# Patient Record
Sex: MALE | Race: WHITE | NOT HISPANIC OR LATINO | Employment: FULL TIME | URBAN - METROPOLITAN AREA
[De-identification: names, ages, dates, MRNs, and addresses within clinical notes are randomized per-mention and may not be internally consistent; named-entity substitution may affect disease eponyms.]

---

## 2022-12-18 ENCOUNTER — APPOINTMENT (EMERGENCY)
Dept: RADIOLOGY | Facility: HOSPITAL | Age: 52
End: 2022-12-18

## 2022-12-18 ENCOUNTER — HOSPITAL ENCOUNTER (EMERGENCY)
Facility: HOSPITAL | Age: 52
Discharge: HOME | End: 2022-12-18
Attending: EMERGENCY MEDICINE

## 2022-12-18 VITALS
TEMPERATURE: 98.6 F | SYSTOLIC BLOOD PRESSURE: 161 MMHG | DIASTOLIC BLOOD PRESSURE: 84 MMHG | RESPIRATION RATE: 18 BRPM | HEART RATE: 91 BPM | OXYGEN SATURATION: 98 %

## 2022-12-18 DIAGNOSIS — S63.501A SPRAIN OF RIGHT WRIST, INITIAL ENCOUNTER: Primary | ICD-10-CM

## 2022-12-18 RX ORDER — IBUPROFEN 600 MG/1
600 TABLET ORAL ONCE
Status: COMPLETED | OUTPATIENT
Start: 2022-12-18 | End: 2022-12-18

## 2022-12-18 RX ADMIN — IBUPROFEN 600 MG: 600 TABLET, FILM COATED ORAL at 21:39

## 2022-12-19 NOTE — ED PROVIDER NOTES
History  Chief Complaint   Patient presents with   • Hand Injury     Pt arrives ambulatory to triage with a c/o right thumb pain after hitting it against firewood that was frozen together to break it apart  55yo left hand dominant male presenting for right thumb and wrist pain that began this evening  Patient was using his hand to break up frozen firewood  He had no pain initially  He woke up from a nap this evening with throbbing pain in his wrist and became worried for a fracture so came to the ED  He took Tylenol prior to arrival  No paresthesias  History provided by:  Patient   used: No    Hand Pain  Location:  R thumb/wrist  Quality:  Pain  Severity:  Moderate  Onset quality:  Gradual  Timing:  Constant  Progression:  Unchanged  Chronicity:  New  Relieved by:  Nothing  Worsened by: Movement  Associated symptoms: no fever        None       Past Medical History:   Diagnosis Date   • A-fib [facility])        History reviewed  No pertinent surgical history  History reviewed  No pertinent family history  I have reviewed and agree with the history as documented  E-Cigarette/Vaping   • E-Cigarette Use Never User      E-Cigarette/Vaping Substances   • Nicotine No    • THC No    • CBD No    • Flavoring No    • Other No    • Unknown No      Social History     Tobacco Use   • Smoking status: Never   • Smokeless tobacco: Never   Vaping Use   • Vaping Use: Never used   Substance Use Topics   • Alcohol use: Never   • Drug use: Never       Review of Systems   Constitutional: Negative for chills and fever  HENT: Negative for ear discharge and facial swelling  Eyes: Negative for discharge  Musculoskeletal: Positive for arthralgias  Negative for joint swelling  Skin: Negative for color change and wound  Neurological: Negative for weakness and numbness  Psychiatric/Behavioral: Negative for confusion  The patient is not nervous/anxious      All other systems reviewed and are negative  Physical Exam  Physical Exam  Vitals and nursing note reviewed  Constitutional:       General: He is not in acute distress  Appearance: Normal appearance  He is not toxic-appearing  HENT:      Head: Normocephalic and atraumatic  Right Ear: External ear normal       Left Ear: External ear normal    Eyes:      General: No scleral icterus  Right eye: No discharge  Left eye: No discharge  Conjunctiva/sclera: Conjunctivae normal    Cardiovascular:      Rate and Rhythm: Normal rate  Pulmonary:      Effort: Pulmonary effort is normal  No respiratory distress  Breath sounds: No stridor  Musculoskeletal:         General: No deformity  Normal range of motion  Right wrist: Tenderness and snuff box tenderness present  No swelling, deformity or lacerations  Normal range of motion  Normal pulse  Cervical back: Normal range of motion  Comments: Right hand: No swelling, deformity, or skin changes noted  There is pain at the base of the thumb and in the snuffbox  There is also pain with flexion of thumb although ROM intact  Sensation normal  2+ radial pulse  Skin:     General: Skin is warm and dry  Neurological:      General: No focal deficit present  Mental Status: He is alert  Mental status is at baseline     Psychiatric:         Mood and Affect: Mood normal          Behavior: Behavior normal          Vital Signs  ED Triage Vitals   Temperature Pulse Respirations Blood Pressure SpO2   12/18/22 2115 12/18/22 2115 12/18/22 2115 12/18/22 2115 12/18/22 2115   98 6 °F (37 °C) 91 18 161/84 98 %      Temp Source Heart Rate Source Patient Position - Orthostatic VS BP Location FiO2 (%)   12/18/22 2115 12/18/22 2115 12/18/22 2115 12/18/22 2115 --   Oral Monitor Sitting Left arm       Pain Score       12/18/22 2139       6           Vitals:    12/18/22 2115   BP: 161/84   Pulse: 91   Patient Position - Orthostatic VS: Sitting         Visual Acuity      ED Medications  Medications   ibuprofen (MOTRIN) tablet 600 mg (600 mg Oral Given 12/18/22 2139)       Diagnostic Studies  Results Reviewed     None                 XR hand 3+ views RIGHT   ED Interpretation by Guanako Castillo PA-C (12/18 2144)   No acute fracture      Final Result by Naman Wilhelm MD (12/19 5287)   No acute osseous abnormality  Workstation performed: [id number]         XR wrist 3+ views RIGHT   ED Interpretation by Guanako Castillo PA-C (12/18 2144)   No acute fracture      Final Result by Naman Wilhelm MD (12/19 9591)   No acute osseous abnormality  Workstation performed: [id number]                    Procedures  Procedures         ED Course                             MDM  Number of Diagnoses or Management Options  Sprain of right wrist, initial encounter: new and requires workup  Diagnosis management comments: 52yoM presenting for R wrist/thumb pain  He was using his hand to break up firewood earlier today  No pain initially but woke up with pain this evening  No deformity or swelling on exam  He has tenderness in the snuffbox  Hand is neurovascularly intact  X-rays of right hand and wrist obtained which are negative for fracture  Cannot r/o occult scaphoid fracture given snuffbox tenderness although mechanism is unusual  He was given a prefabricated thumb spica brace and advised to f/u with orthopedics          Amount and/or Complexity of Data Reviewed  Tests in the radiology section of CPT®: ordered and reviewed  Independent visualization of images, tracings, or specimens: yes    Risk of Complications, Morbidity, and/or Mortality  Presenting problems: low  Diagnostic procedures: low  Management options: low    Patient Progress  Patient progress: stable      Disposition  Final diagnoses:   Sprain of right wrist, initial encounter     Time reflects when diagnosis was documented in both MDM as applicable and the Disposition within this note     Time User Action Codes Description Comment    12/18/2022  9:57 PM Rena Can [[id number]] Sprain of right wrist, initial encounter       ED Disposition     ED Disposition   Discharge    Condition   Stable    Date/Time   Sun Dec 18, 2022  9:57 PM    Comment   Leo Lopez Patches discharge to home/self care  Follow-up Information     Follow up With Specialties Details Why Contact Info Additional [address] [facility] Orthopedic Surgery Schedule an appointment as soon as possible for a visit   [address] [place] [address] Lobo Hinojosa [address] [phone] [facility], [address], [place], [place], [phone] [facility] Emergency Department Emergency Medicine  If symptoms worsen [address] Emergency Department, [address] [place] [address], [phone]          There are no discharge medications for this patient  No discharge procedures on file      PDMP Review     None          ED Provider  Electronically Signed by           Sebastian Contreras PA-C  12/19/22 1208

## 2023-01-27 ENCOUNTER — OFFICE VISIT (OUTPATIENT)
Dept: OBGYN CLINIC | Facility: CLINIC | Age: 53
End: 2023-01-27

## 2023-01-27 VITALS
SYSTOLIC BLOOD PRESSURE: 120 MMHG | HEART RATE: 64 BPM | BODY MASS INDEX: 26.31 KG/M2 | HEIGHT: 74 IN | DIASTOLIC BLOOD PRESSURE: 81 MMHG | WEIGHT: 205 LBS

## 2023-01-27 DIAGNOSIS — M18.11 ARTHRITIS OF CARPOMETACARPAL (CMC) JOINT OF RIGHT THUMB: Primary | ICD-10-CM

## 2023-01-27 NOTE — PROGRESS NOTES
Assessment/Plan:  Assessment/Plan   Diagnoses and all orders for this visit:    Arthritis of carpometacarpal [place]) joint of right thumb  -     CMC brace          27-year-old left-hand-dominant male with onset of right thumb pain from injury 12/18/2022  Discussed with patient physical exam, radiographs, impression, and plan  X-rays of the right hand and wrist are unremarkable for acute osseous abnormality  Physical exam noted for mild tenderness right thumb CMC joint  He has intact range of motion and strength of the wrist and digits of the hand  There is mild pain with CMC grind  He is intact neurovascularly  Clinical impression is that he aggravated CMC joint arthritis  I discussed treatment regimen of CMC brace and supplements  Injection is not warranted at this time  I provided Aia 16 brace which he may wear during physical activity  He is to start taking turmeric at least 1000 mg daily, tart cherry at least 1000 mg daily, and glucosamine-chondroitin 2-3 times a day  He will follow-up as needed  Subjective:   Patient ID: Cassandra Ferrari is a 47 y o  male  Chief Complaint   Patient presents with   • Right Thumb - Pain       27-year-old left-hand-dominant male presents for evaluation of right thumb pain onset from injury 12/18/2022  He was banging on frozen firewood to break them up with his bare hands  He woke up from a nap and had pain described as sudden in onset, localized to the base of the thumb at the Aia 16 joint and radiating to the thenar eminence, worse with movement of the thumb and gripping, and improved with resting  He presented to the emergency room where x-ray evaluation was unremarkable  He self treated with Tylenol and rest and symptoms started to improve  He reports feeling much better  He reports still experiencing some achiness and throbbing with weather changes particularly when it is colder or damp  Hand Pain  This is a new problem   The current episode started more than 1 month ago  The problem occurs intermittently  The problem has been gradually improving  Associated symptoms include arthralgias  Pertinent negatives include no abdominal pain, chest pain, chills, fever, joint swelling, numbness, rash, sore throat or weakness  Exacerbated by: Gripping  He has tried rest and acetaminophen for the symptoms  The treatment provided significant relief  The following portions of the patient's history were reviewed and updated as appropriate: He  has a past medical history of A-fib ([address] )  He  has no past surgical history on file  His family history is not on file  He  reports that he has never smoked  He has never used smokeless tobacco  He reports that he does not drink alcohol and does not use drugs  He has No Known Allergies       Review of Systems   Constitutional: Negative for chills and fever  HENT: Negative for sore throat  Eyes: Negative for visual disturbance  Respiratory: Negative for shortness of breath  Cardiovascular: Negative for chest pain  Gastrointestinal: Negative for abdominal pain  Genitourinary: Negative for flank pain  Musculoskeletal: Positive for arthralgias  Negative for joint swelling  Skin: Negative for rash and wound  Neurological: Negative for weakness and numbness  Hematological: Does not bruise/bleed easily  Psychiatric/Behavioral: Negative for self-injury  Objective:  Vitals:    01/27/23 0811   BP: 120/81   Pulse: 64   Weight: 93 kg (205 lb)   Height: 6' 2" (1 88 m)     Right Hand Exam     Muscle Strength   The patient has normal right wrist strength  Tests   Finkelstein's test: negative    Other   Sensation: normal  Pulse: present          Observations     Right Wrist/Hand   Negative for deformity  Tenderness     Right Wrist/Hand   Tenderness in the carpometacarpal joint   No tenderness in the first dorsal compartment, second dorsal compartment, fifth dorsal compartment, sixth dorsal compartment, triangular fibrocartilage complex , scaphoid and lunate  Active Range of Motion     Right Wrist   Normal active range of motion    Strength/Myotome Testing     Right Wrist/Hand   Normal wrist strength    Tests     Right Wrist/Hand   Positive CMC grind  Negative AIN OK sign, distal radial-ulnar joint stress, Finkelstein's and TFCC load  Physical Exam  Vitals and nursing note reviewed  Constitutional:       General: He is not in acute distress  Appearance: He is well-developed  He is not ill-appearing or diaphoretic  HENT:      Head: Normocephalic and atraumatic  Right Ear: External ear normal       Left Ear: External ear normal    Eyes:      Conjunctiva/sclera: Conjunctivae normal    Neck:      Trachea: No tracheal deviation  Cardiovascular:      Rate and Rhythm: Normal rate  Pulmonary:      Effort: Pulmonary effort is normal  No respiratory distress  Abdominal:      General: There is no distension  Musculoskeletal:         General: Tenderness present  No swelling, deformity or signs of injury  Right hand: No deformity  Skin:     General: Skin is warm and dry  Coloration: Skin is not jaundiced or pale  Neurological:      Mental Status: He is alert and oriented to person, place, and time  Psychiatric:         Mood and Affect: Mood normal          Behavior: Behavior normal          Thought Content: Thought content normal          Judgment: Judgment normal          I have personally reviewed pertinent films in PACS and my interpretation is No acute osseous abnormality right wrist and hand

## 2023-05-15 ENCOUNTER — OFFICE VISIT (OUTPATIENT)
Age: 53
End: 2023-05-15

## 2023-05-15 VITALS
TEMPERATURE: 98.5 F | DIASTOLIC BLOOD PRESSURE: 102 MMHG | HEART RATE: 83 BPM | BODY MASS INDEX: 27.54 KG/M2 | RESPIRATION RATE: 18 BRPM | HEIGHT: 74 IN | SYSTOLIC BLOOD PRESSURE: 143 MMHG | WEIGHT: 214.6 LBS | OXYGEN SATURATION: 98 %

## 2023-05-15 DIAGNOSIS — R42 VERTIGO: Primary | ICD-10-CM

## 2023-05-15 RX ORDER — MECLIZINE HCL 12.5 MG/1
25 TABLET ORAL EVERY 8 HOURS PRN
Status: SHIPPED | OUTPATIENT
Start: 2023-05-15

## 2023-05-15 RX ORDER — METRONIDAZOLE 10 MG/G
GEL TOPICAL DAILY
COMMUNITY

## 2023-05-15 NOTE — PATIENT INSTRUCTIONS
Benign Paroxysmal Positional Vertigo   WHAT YOU NEED TO KNOW:   BPPV is an inner ear condition that causes you to suddenly feel dizzy  Benign means it is not serious or life-threatening  BPPV is caused by a problem with the nerves and structure of your inner ear  BPPV happens when small pieces of calcium break loose and lump together in one of your inner ear canals  DISCHARGE INSTRUCTIONS:   Return to the emergency department if:   You fall during a BPPV episode and are injured  You have a severe headache that does not go away  You have new changes in your vision or feel weak or confused  You have problems hearing, or you have ringing or buzzing in your ears  Contact your healthcare provider if:   Your BPPV symptoms do not go away or they return  You have problems with your balance, or you are falling often  You have new or increased nausea or vomiting with vertigo  You feel anxious or depressed and do not want to leave your home  You have questions or concerns about your condition or care  Medicines:   Medicines  may be recommended or prescribed to treat dizziness or nausea  Take your medicine as directed  Contact your healthcare provider if you think your medicine is not helping or if you have side effects  Tell your provider if you are allergic to any medicine  Keep a list of the medicines, vitamins, and herbs you take  Include the amounts, and when and why you take them  Bring the list or the pill bottles to follow-up visits  Carry your medicine list with you in case of an emergency  Prevent your symptoms:   Try to avoid sudden head movements  Stand up and lie down slowly  Raise and support your head when you lie down  Place pillows under your upper back and head or rest in a recliner  Change your position often when you are lying down  Try not to lie with your head on the same side for long periods of time  Roll over slowly       Wear protective gear  when you ride a bike or play sports  A helmet helps protect your head from injury  Follow up with your healthcare provider as directed: You may need to return in 1 month to check the progress of your treatment  Write down your questions so you remember to ask them during your visits  © Copyright [employer] 2022 Information is for End User's use only and may not be sold, redistributed or otherwise used for commercial purposes  The above information is an  only  It is not intended as medical advice for individual conditions or treatments  Talk to your doctor, nurse or pharmacist before following any medical regimen to see if it is safe and effective for you

## 2023-05-15 NOTE — PROGRESS NOTES
[facility]        NAME: Daja Gastelum is a 47 y o  male  : 1970    MRN: [id number]  DATE: May 15, 2023  TIME: 7:32 PM    Assessment and Plan   Vertigo [R42]  1  Vertigo  meclizine (ANTIVERT) tablet 25 mg    Ambulatory Referral to Physical Therapy            Patient Instructions       Follow up with PCP in 3-5 days  Proceed to  ER if symptoms worsen  Chief Complaint     Chief Complaint   Patient presents with   • Dizziness     Patient stated 2 days ago while walking the room was spinning  Complained when turning his head in bed he gets dizzy and fatigue  Otc was taken  History of Present Illness       Patient is a 36-year-old male who has been experiencing the room turning, dizziness, and balance for approximately 2 days  Review of Systems   Review of Systems   Constitutional: Negative for activity change, appetite change, chills and fever  HENT: Positive for congestion, postnasal drip and rhinorrhea  Negative for sore throat  Eyes: Negative for photophobia and visual disturbance  Respiratory: Negative for cough  Cardiovascular: Negative for chest pain and palpitations  Gastrointestinal: Negative for abdominal pain  Musculoskeletal: Negative for myalgias  Skin: Negative for pallor  Neurological: Positive for light-headedness  Negative for dizziness, weakness and headaches           Current Medications       Current Outpatient Medications:   •  metroNIDAZOLE (METROGEL) 1 % gel, Apply topically daily, Disp: , Rfl:     Current Facility-Administered Medications:   •  meclizine (ANTIVERT) tablet 25 mg, 25 mg, Oral, Q8H PRN, Iraida Mcbride PA-C    Current Allergies     Allergies as of 05/15/2023 - Reviewed 05/15/2023   Allergen Reaction Noted   • Pollen extract Cough 2010            The following portions of the patient's history were reviewed and updated as appropriate: allergies, current medications, past family history, past medical history, past social "history, past surgical history and problem list      Past Medical History:   Diagnosis Date   • A-fib [facility])        History reviewed  No pertinent surgical history  History reviewed  No pertinent family history  Medications have been verified  Objective   BP (!) 143/102   Pulse 83   Temp 98 5 °F (36 9 °C)   Resp 18   Ht 6' 2\" (1 88 m)   Wt 97 3 kg (214 lb 9 6 oz)   SpO2 98%   BMI 27 55 kg/m²        Physical Exam     Physical Exam  Vitals and nursing note reviewed  Constitutional:       General: He is not in acute distress  Appearance: Normal appearance  He is not ill-appearing  HENT:      Head: Normocephalic and atraumatic  Right Ear: Tympanic membrane, ear canal and external ear normal       Left Ear: Tympanic membrane, ear canal and external ear normal       Nose: Rhinorrhea present  No congestion  Mouth/Throat:      Mouth: Mucous membranes are moist       Pharynx: Oropharynx is clear  Eyes:      General: No scleral icterus  Extraocular Movements: Extraocular movements intact  Conjunctiva/sclera: Conjunctivae normal       Pupils: Pupils are equal, round, and reactive to light  Cardiovascular:      Rate and Rhythm: Normal rate and regular rhythm  Pulses: Normal pulses  Heart sounds: Normal heart sounds  Pulmonary:      Effort: Pulmonary effort is normal  No respiratory distress  Breath sounds: Normal breath sounds  Musculoskeletal:         General: Normal range of motion  Cervical back: Normal range of motion and neck supple  No tenderness  Lymphadenopathy:      Cervical: No cervical adenopathy  Skin:     General: Skin is warm  Capillary Refill: Capillary refill takes less than 2 seconds  Neurological:      General: No focal deficit present  Mental Status: He is alert and oriented to person, place, and time  Cranial Nerves: No cranial nerve deficit        Coordination: Coordination normal       Gait: Gait normal  " Psychiatric:         Mood and Affect: Mood normal          Behavior: Behavior normal          Thought Content:  Thought content normal          Judgment: Judgment normal

## 2023-05-16 DIAGNOSIS — R42 VERTIGO: Primary | ICD-10-CM

## 2023-05-16 RX ORDER — MECLIZINE HYDROCHLORIDE 25 MG/1
TABLET ORAL
Qty: 18 TABLET | Refills: 0 | Status: SHIPPED | OUTPATIENT
Start: 2023-05-16

## 2023-05-19 ENCOUNTER — EVALUATION (OUTPATIENT)
Age: 53
End: 2023-05-19

## 2023-05-19 DIAGNOSIS — H81.11 BENIGN PAROXYSMAL POSITIONAL VERTIGO OF RIGHT EAR: ICD-10-CM

## 2023-05-19 DIAGNOSIS — R42 VERTIGO: Primary | ICD-10-CM

## 2023-05-19 DIAGNOSIS — H83.2X2 VESTIBULAR HYPOFUNCTION OF LEFT EAR: ICD-10-CM

## 2023-05-19 NOTE — PROGRESS NOTES
PT Evaluation          POC expires Auth Status Total   Visits  Start date  Expiration date PT/OT + Visit Limit? Co-Insurance   23 required 30 pcy 23  No                                           Visit/Unit Tracking  AUTH Status: submitted Date               Visits  Authed: 30 pcy Used                Remaining                         Today's date: 2023  Patient name: Petra Finch Patches  : 1970  MRN: [id number]  Referring provider: Aaliyah Baires PA-C  Dx:   Encounter Diagnosis     ICD-10-CM    1  Vertigo  R42 Ambulatory Referral to Physical Therapy      2  Benign paroxysmal positional vertigo of right ear  H81 11       3  Vestibular hypofunction of left ear  H83 2X2           Assessment  Assessment details: Patient is a 47 y o  Male who presents to skilled outpatient PT with dizziness  Upon initial evaluation, pt presents with positive head thrust L side, and symptomatic (nausea/dizziness) with R Jumana Hallpike, and R sidelying  Pt was treated with R CRT 3x, negative upon reassessment  Significant amount of time spent completing patient education on diagnosis, prognosis, POC, medications, and HEP  Physical handouts provided for information and HEP  Will see pt 1-3x/week as able  POC complicated by scheduled trip memorial day lasting 3 weeks  Will cont to assess as able  He will benefit from skilled OP PT to dec dizziness and return to PLOF  Patient verbalized understanding of POC  Please contact me if you have any questions or recommendations  Thank you for the referral and the opportunity to share in [address] [place] [address]      Impairments: activity intolerance  Understanding of Dx/Px/POC: good  Prognosis: good    Goals  BPPV Goals (4 weeks):  - Patient will report complete resolution of symptoms in order to promote return to PLOF  - Patient will complete FGA in order to promote return to safe performance of ADLs  - Patient will demonstrate (-) Jumana-Hallpike test on R side  - Patient will demonstrate (-) head thrust on L side    Plan  Plan details: Will see pt as able, reports going on a trip on memorial day and will be ogne for 3 weeks  Patient would benefit from: PT [place]  Planned therapy interventions: balance, HEP, manual therapy, neuromuscular re-education, patient education  Frequency: 1-3x per week  Duration in weeks: 4  Plan of Care beginning date: 23  Plan of Care expiration date: 4 weeks - 2023  Treatment plan discussed with: Patient      Subjective Evaluation  History of Present Illness  Mechanism of injury: Pt reports when he turns his head when standing up or laying down he gets room spinning dizziness  Monday night he went to urgent care and received meclizine but has not taken it since last night       Dizziness Subjective  How long does dizziness last: 30s- 60s  How would you describe the dizziness: room spinning  Rolling in bed: Yes  Supine to/from sit: Yes  Recent hearing loss: No  Tinnitus: No  Aural fullness/ear pain: Yes  Vision changes: No  History of recent viral infections: Yes  History of migraines: No    Red Flag Screen  - Numbness: No  - Tingling: No  - Weakness: No  - Unilateral hearing loss: No  - Slurred speech: No  - Progressive hearing loss: No  - Tremors: No  - Poor coordination: No  - UMN signs: No  - LoC: No  - Rigidity: No  - Visual field loss: No  - Memory loss: No  - CN dysfunction: No  - Vertical nystagmus: No    Pain  Current pain ratin/10  Employment status: full time     Objective   Vestibular Objective  Cervical Spine AROM:  - Flexion: WFL no pain  - Extension: WFL no pain  - R Rotation: WFL no pain  - L Rotation: WFL no pain  - R Lateral Flexion: WFL no pain  - L Lateral Flexion: WFL no pain    Integrity Testing  - mVBI: ULU7  Gutierrez Yancey Rika: [facility]  - Alar Stability Test: WFL  - Posture: WFL    Oculomotor Screen  - Baseline Symptoms: 0/10  - Gaze "Holding Nystagmus: H: Normal and V: Normal Dizziness: 0/10, Observation: WFL  - Spontaneous Nystagmus Room Light: H: Normal Dizziness: 0/10, Observation: WFL  - Smooth Pursuits (central): H: Normal and V: Normal Dizziness: 0/10, Observation: WFL  - Saccades (central): H: Normal Dizziness: 0/10, Observation: WFL  - Near Pederson & Meri (normal: < 4\"/10 cm - central): H: Normal Dizziness: 0/10, Observation: WFL  - Head Thrust (moderate to severe hypofunction): H: Abnormal Dizziness: 0/10, Observation: positive L side    Positional Testing  - R Blackduck-Hallpike: Negative- symptomatic, dizzy and nausea upon return to sitting  - L Blackduck-Hallpike: Negative  - R Roll Test: Negative  - L Roll Test: Negative   - R side-lying: Negative- symptomatic, dizzy and nausea upon returning to sitting     Outcome Measures Initial Eval  5/19        mCTSIB  - FTEO (firm)  - FTEC (firm)  - FTEO (foam)  - FTEC (foam)   Defer        DGI Defer        FGA Defer        10 meter Defer        DHI Defer            Epley completed 3x  HEP R BPPV habituation   Eductaion: BPPV, after CRT, hypofunction    Precautions: sleep apnea  Past Medical History:   Diagnosis Date   • A-fib ([facility] [address] )      "

## 2023-05-22 ENCOUNTER — OFFICE VISIT (OUTPATIENT)
Age: 53
End: 2023-05-22

## 2023-05-22 DIAGNOSIS — H81.11 BENIGN PAROXYSMAL POSITIONAL VERTIGO OF RIGHT EAR: ICD-10-CM

## 2023-05-22 DIAGNOSIS — H83.2X2 VESTIBULAR HYPOFUNCTION OF LEFT EAR: ICD-10-CM

## 2023-05-22 DIAGNOSIS — R42 VERTIGO: Primary | ICD-10-CM

## 2023-05-22 NOTE — PROGRESS NOTES
Daily Note     Today's date: 2023  Patient name: Gabi Parikh Patches  : 1970  MRN: [id number]  Referring provider: Bartolo Mcadams PA-C  Dx:   Encounter Diagnosis     ICD-10-CM    1  Vertigo  R42       2  Benign paroxysmal positional vertigo of right ear  H81 11       3  Vestibular hypofunction of left ear  H83 2X2         Start Time: 0845  Stop Time: 0910  Total time in clinic (min): 25 minutes    Subjective: Pt reports last dose of meclizine was  morning  Has not had any dizziness since last session  Objective: See treatment diary below    NMR  R Cabool Hallpike: Negative 3x  L Jumana Hallpike: negative  Roll: Negative B/L  Head thrust: Negative     Patient education: Prognosis, POC    Assessment: Pt tolerated treatment well  All positional testing negative, and negative head thrust bilaterally  Significant amount of time spent completing patient education on prognosis, results of testing, and POC  Pt to return for one more f/u apt to ensure clearance without taking meclizine  Patient would benefit from continued PT to dec dizziness and return to PLOF  Plan: Continue per plan of care  POC expires Auth Status Total   Visits  Start date  Expiration date PT/OT + Visit Limit?  Co-Insurance   23 required 30 pcy 23  No                                           Visit/Unit Tracking  AUTH Status: submitted Date              Visits  Authed: 30 pcy Used                Remaining

## 2023-05-25 ENCOUNTER — OFFICE VISIT (OUTPATIENT)
Age: 53
End: 2023-05-25

## 2023-05-25 DIAGNOSIS — H81.11 BENIGN PAROXYSMAL POSITIONAL VERTIGO OF RIGHT EAR: ICD-10-CM

## 2023-05-25 DIAGNOSIS — H83.2X2 VESTIBULAR HYPOFUNCTION OF LEFT EAR: ICD-10-CM

## 2023-05-25 DIAGNOSIS — R42 VERTIGO: Primary | ICD-10-CM

## 2023-05-25 NOTE — PROGRESS NOTES
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 hanging blinds  Objective: See treatment diary below    NMR  R Jumana Hallpike: Negative 3x  L Jumana Hallpike: negative  Roll: Negative B/L  Head thrust: Negative  FGA:   MCTSIB: 30, 30, 30, 30  Patient education: Prognosis, recurrence rate    Assessment: Pt tolerated treatment well  All positional testing negative, and negative head thrust bilaterally  Per testing, he is not at an inc risk for falls and is scoring within functional limits  Significant amount of time spent completing patient education on prognosis, results of testing, recurrence and discharge  At this time, pt will be discharged as no further acute dizziness  Pt agreeable  Should pt wish to return to PT in the future, he would require a new script and POC  Plan: Discharged  POC expires Auth Status Total   Visits  Start date  Expiration date PT/OT + Visit Limit?  Co-Insurance   23 required 30 pcy 23  No                                           Visit/Unit Tracking  AUTH Status: submitted Date             Visits  Authed: 30 pcy Used                Remaining

## 2023-09-19 ENCOUNTER — OFFICE VISIT (OUTPATIENT)
Age: 53
End: 2023-09-19
Payer: COMMERCIAL

## 2023-09-19 VITALS
HEART RATE: 76 BPM | BODY MASS INDEX: 27.54 KG/M2 | DIASTOLIC BLOOD PRESSURE: 80 MMHG | HEIGHT: 74 IN | TEMPERATURE: 96.9 F | SYSTOLIC BLOOD PRESSURE: 122 MMHG | OXYGEN SATURATION: 99 % | RESPIRATION RATE: 18 BRPM | WEIGHT: 214.6 LBS

## 2023-09-19 DIAGNOSIS — L25.9 CONTACT DERMATITIS, UNSPECIFIED CONTACT DERMATITIS TYPE, UNSPECIFIED TRIGGER: Primary | ICD-10-CM

## 2023-09-19 PROCEDURE — 99213 OFFICE O/P EST LOW 20 MIN: CPT | Performed by: EMERGENCY MEDICINE

## 2023-09-19 RX ORDER — PREDNISONE 10 MG/1
TABLET ORAL
Qty: 40 TABLET | Refills: 0 | Status: SHIPPED | OUTPATIENT
Start: 2023-09-19

## 2023-09-19 NOTE — PATIENT INSTRUCTIONS
1.  Continue to apply Aveeno to areas of rash  2. Wash hands frequently  3. Take the prednisone until gone  4. Follow-up with PCP in 2 to 3 days  5.   If symptoms get worse to to the ER

## 2023-09-19 NOTE — PROGRESS NOTES
[facility]        NAME: Cassy Baum is a 47 y.o. male  : 1970    MRN: [id number]  DATE: 2023  TIME: 9:17 AM    Assessment and Plan   Contact dermatitis, unspecified contact dermatitis type, unspecified trigger [L25.9]  1. Contact dermatitis, unspecified contact dermatitis type, unspecified trigger  predniSONE 10 mg tablet            Patient Instructions   Patient Instructions   1. Continue to apply Aveeno to areas of rash  2. Wash hands frequently  3. Take the prednisone until gone  4. Follow-up with PCP in 2 to 3 days  5. If symptoms get worse to to the ER        Follow up with PCP in 3-5 days. Proceed to  ER if symptoms worsen. Chief Complaint     Chief Complaint   Patient presents with   • Possible shingles     Patient stated he got the shingles vaccine about a weeks ago. Stated he now have a rash on his right arm. History of Present Illness       81-year-old white male with a chief complaint of getting a shingles vaccine a week ago and now with a rash on his right arm. 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 The rash is not painful or burning. Review of Systems   Review of Systems   Constitutional: Negative for chills and fever. HENT: Negative for congestion and rhinorrhea. Eyes: Negative for discharge and visual disturbance. Respiratory: Negative for shortness of breath and wheezing. Cardiovascular: Negative for chest pain and palpitations. Gastrointestinal: Negative for abdominal pain and vomiting. Endocrine: Negative for polydipsia and polyuria. Genitourinary: Negative for dysuria and hematuria. Musculoskeletal: Negative for arthralgias, gait problem and neck stiffness. Skin: Positive for color change and rash. Negative for wound. Neurological: Negative for dizziness and headaches. Psychiatric/Behavioral: Negative for confusion and suicidal ideas.          Current Medications       Current Outpatient Medications:   •  metroNIDAZOLE (METROGEL) 1 % gel, Apply topically daily, Disp: , Rfl:   •  predniSONE 10 mg tablet, Take 4 pills x4 days, then 3 pills x4 days, then 2 pills x4 days, and then 1 pill x4 days, Disp: 40 tablet, Rfl: 0  •  meclizine (ANTIVERT) 25 mg tablet, Take 2 pills 3 times a day x2 days, then Take 1 pills  2 times a day x2 days, then take 1 pill once a day x2 days (Patient not taking: Reported on 9/19/2023), Disp: 18 tablet, Rfl: 0    Current Facility-Administered Medications:   •  meclizine (ANTIVERT) tablet 25 mg, 25 mg, Oral, Q8H PRN, Saskia Nicholas PA-C    Current Allergies     Allergies as of 09/19/2023 - Reviewed 09/19/2023   Allergen Reaction Noted   • Pollen extract Cough 01/01/2010            The following portions of the patient's history were reviewed and updated as appropriate: allergies, current medications, past family history, past medical history, past social history, past surgical history and problem list.     Past Medical History:   Diagnosis Date   • A-fib ([address])        No past surgical history on file. No family history on file. Medications have been verified. Objective   /80   Pulse 76   Temp (!) 96.9 °F (36.1 °C)   Resp 18   Ht 6' 2" (1.88 m)   Wt 97.3 kg (214 lb 9.6 oz)   SpO2 99%   BMI 27.55 kg/m²        Physical Exam     Physical Exam  Vitals and nursing note reviewed. Constitutional:       Appearance: Normal appearance. Comments: 60-year-old white male with a rash on his right forearm   HENT:      Head: Normocephalic and atraumatic. Comments: Patient has rosacea on his temple on the right side of his cheek. I do not appreciate any herpetic lesions. Eyes:      Extraocular Movements: Extraocular movements intact. Cardiovascular:      Rate and Rhythm: Normal rate. Pulmonary:      Effort: Pulmonary effort is normal.   Musculoskeletal:      Cervical back: Normal range of motion. Skin:     General: Skin is warm and dry. Findings: Erythema and rash present. Comments: Patient has a maculopapular rash in the antecubital region of his right forearm. There is also some linear rash to the volar aspect of his distal forearm. I do not appreciate any blisters. I believe rash is more consistent with a contact dermatitis versus shingles. Neurological:      Mental Status: He is alert.    Psychiatric:         Mood and Affect: Mood normal.

## 2024-02-09 ENCOUNTER — HOSPITAL ENCOUNTER (OUTPATIENT)
Dept: ULTRASOUND IMAGING | Facility: CLINIC | Age: 54
Discharge: HOME | End: 2024-02-09
Payer: COMMERCIAL

## 2024-02-09 DIAGNOSIS — K40.90 UNILATERAL INGUINAL HERNIA, WITHOUT OBSTRUCTION OR GANGRENE, NOT SPECIFIED AS RECURRENT: ICD-10-CM

## 2024-02-09 PROCEDURE — 76705 ECHO EXAM OF ABDOMEN: CPT

## 2024-03-12 ENCOUNTER — TELEMEDICINE (OUTPATIENT)
Dept: DERMATOLOGY | Facility: CLINIC | Age: 54
End: 2024-03-12
Payer: COMMERCIAL

## 2024-03-12 VITALS — HEIGHT: 74 IN | BODY MASS INDEX: 26.69 KG/M2 | WEIGHT: 208 LBS

## 2024-03-12 DIAGNOSIS — L71.9 ROSACEA: Primary | ICD-10-CM

## 2024-03-12 PROCEDURE — 99204 OFFICE O/P NEW MOD 45 MIN: CPT | Performed by: STUDENT IN AN ORGANIZED HEALTH CARE EDUCATION/TRAINING PROGRAM

## 2024-03-12 RX ORDER — METRONIDAZOLE 10 MG/G
GEL TOPICAL
Qty: 60 G | Refills: 3 | Status: SHIPPED | OUTPATIENT
Start: 2024-03-12

## 2024-03-12 RX ORDER — BIOTIN 10 MG
1 TABLET ORAL
COMMUNITY

## 2024-03-12 RX ORDER — AZELAIC ACID 0.15 G/G
GEL TOPICAL
Qty: 30 G | Refills: 3 | Status: SHIPPED | OUTPATIENT
Start: 2024-03-12

## 2024-03-12 RX ORDER — AZELAIC ACID 0.15 G/G
GEL TOPICAL 2 TIMES DAILY
COMMUNITY
End: 2024-03-12 | Stop reason: SDUPTHER

## 2024-03-12 RX ORDER — DOXYCYCLINE HYCLATE 20 MG
TABLET ORAL
Qty: 60 TABLET | Refills: 1 | Status: SHIPPED | OUTPATIENT
Start: 2024-03-12 | End: 2024-05-11

## 2024-03-12 RX ORDER — CYANOCOBALAMIN (VITAMIN B-12) 500 MCG
2 TABLET ORAL
COMMUNITY

## 2024-03-12 NOTE — PROGRESS NOTES
Virtual Regular Visit    Verification of patient location:    Patient is located at Home in the following state in which I hold an active license [place]      Assessment/Plan:    Problem List Items Addressed This Visit    None           Reason for visit is   Chief Complaint   Patient presents with    Virtual Regular Visit          Encounter provider Garrett Bull MD    Provider located at DERMATOLOGY [address] [place] [address] [phone]      Recent Visits  No visits were found meeting these conditions.  Showing recent visits within past 7 days and meeting all other requirements  Today's Visits  Date Type Provider Dept   03/12/24 Telemedicine Garrett Bull MD [facility]   Showing today's visits and meeting all other requirements  Future Appointments  No visits were found meeting these conditions.  Showing future appointments within next 150 days and meeting all other requirements       The patient was identified by name and date of birth. Gonzalez Sunshine was informed that this is a telemedicine visit and that the visit is being conducted through the Epic Embedded platform. He agrees to proceed..  My office door was closed. No one else was in the room.  He acknowledged consent and understanding of privacy and security of the video platform. The patient has agreed to participate and understands they can discontinue the visit at any time.    Patient is aware this is a billable service.     Subjective  Gonzalez Sunshine is a 53 y.o. male Rosacea, new patient .      HPI     Past Medical History:   Diagnosis Date    A-fib (HCC)        No past surgical history on file.    Current Outpatient Medications   Medication Sig Dispense Refill    metroNIDAZOLE (METROGEL) 1 % gel Apply topically daily      meclizine (ANTIVERT) 25 mg tablet Take 2 pills 3 times a day x2 days, then Take 1 pills  2 times a day x2 days, then take 1  "pill once a day x2 days (Patient not taking: Reported on 9/19/2023) 18 tablet 0    Multiple Vitamins-Minerals (Multivitamin Adult) CHEW Chew 1 tablet      Omega-3 Fatty Acids (Fish Oil) 300 MG CAPS Take 2 capsules by mouth      predniSONE 10 mg tablet Take 4 pills x4 days, then 3 pills x4 days, then 2 pills x4 days, and then 1 pill x4 days 40 tablet 0     Current Facility-Administered Medications   Medication Dose Route Frequency Provider Last Rate Last Admin    meclizine (ANTIVERT) tablet 25 mg  25 mg Oral Q8H PRN London Geronimo PA-C            Allergies   Allergen Reactions    Bee Pollen Cough    Pollen Extract Cough       Review of Systems    Video Exam    Vitals:    03/12/24 1039   Weight: 94.3 kg (208 lb)   Height: 6' 2\" (1.88 m)       Physical Exam     Visit Time  Total Visit Duration: 15 mins    [facility] Note     Patient Name: Gonzalez Sunshine  Encounter Date: 3/12/24     Have you been cared for by a [facility] Dermatologist in the last 3 years and, if so, which description applies to you?    NO.   I am considered a \"new\" patient and must complete all patient intake questions. I am MALE/not capable of bearing children.    REVIEW OF SYSTEMS:  Have you recently had or currently have any of the following? Recent fever or chills? No  Any non-healing wound? No   PAST MEDICAL HISTORY:  Have you personally ever had or currently have any of the following?  If \"YES,\" then please provide more detail. Skin cancer (such as Melanoma, Basal Cell Carcinoma, Squamous Cell Carcinoma?  No  Tuberculosis, HIV/AIDS, Hepatitis B or C: No  Radiation Treatment No   HISTORY OF IMMUNOSUPPRESSION:   Do you have a history of any of the following:  Systemic Immunosuppression such as Diabetes, Biologic or Immunotherapy, Chemotherapy, Organ Transplantation, Bone Marrow Transplantation?  No     Answering \"YES\" requires the addition of the dotphrase \"IMMUNOSUPPRESSED\" as the first diagnosis of the patient's visit. " "  FAMILY HISTORY:  Any \"first degree relatives\" (parent, brother, sister, or child) with the following?    Skin Cancer, Pancreatic or Other Cancer? YES, mother-melanoma   PATIENT EXPERIENCE:    Do you want the Dermatologist to perform a COMPLETE skin exam today including a clinical examination under the \"bra and underwear\" areas?  NO  If necessary, do we have your permission to call and leave a detailed message on your Preferred Phone number that includes your specific medical information?  Yes      Allergies   Allergen Reactions    Bee Pollen Cough    Pollen Extract Cough      Current Outpatient Medications:     metroNIDAZOLE (METROGEL) 1 % gel, Apply topically daily, Disp: , Rfl:     meclizine (ANTIVERT) 25 mg tablet, Take 2 pills 3 times a day x2 days, then Take 1 pills  2 times a day x2 days, then take 1 pill once a day x2 days (Patient not taking: Reported on 9/19/2023), Disp: 18 tablet, Rfl: 0    Multiple Vitamins-Minerals (Multivitamin Adult) CHEW, Chew 1 tablet, Disp: , Rfl:     Omega-3 Fatty Acids (Fish Oil) 300 MG CAPS, Take 2 capsules by mouth, Disp: , Rfl:     predniSONE 10 mg tablet, Take 4 pills x4 days, then 3 pills x4 days, then 2 pills x4 days, and then 1 pill x4 days, Disp: 40 tablet, Rfl: 0    Current Facility-Administered Medications:     meclizine (ANTIVERT) tablet 25 mg, 25 mg, Oral, Q8H PRN, London Geronimo PA-C          Whom besides the patient is providing clinical information about today's encounter?   NO ADDITIONAL HISTORIAN (patient alone provided history)    Physical Exam and Assessment/Plan by Diagnosis:    ROSACEA    Physical Exam:  Anatomic Location Affected:  Cheeks, and forehead  Morphological Description:  Background centrofacial erythema  Pertinent Positives:  Pertinent Negatives:    Additional History of Present Condition:    Duration: 10 years  Triggers: stress, spicy foods  Treatments attempted: azaelic acid, metrogel, and sometimes uses orachea tablet form (time released " "doxycycline)  Ocular symptoms? (Frequent styes, foreign body sensation in eyes, redness/irritation):  No    Assessment and Plan:  History and physical most consistent with rosacea  Educated that rosacea is a chronic condition affecting the mid-face (nose, cheeks, chin, forehead, eyelids)  Educated that rosacea often presents as redness, telangiectasias, papules and pustules  Discussed that the underlying cause of rosacea is complex, including genetic, environmental, vascular and inflammatory factors  Discussed that there are various triggers to rosacea and that keeping a symptom journal can be helpful in identifying those triggers that make rosacea worse as some are patient-specific  Educated that UV exposure is a universal trigger and recommended use of daily SPF. Specifically recommended a physical blocker SPF to reduce the risk of flaring secondary to irritation from chemical blocker.  Educated on the use of green tinted makeup to mask red coloration  Discussed treatment ladder including trigger avoidance, SPF use, topical treatment (metronidazole, ivermectin, azelaic acid, brimonidine gel, pimecrolimus), oral treatment options (short courses of oral antibiotics with sub-microbial dosing possible), laser treatment  Based on a thorough discussion of this condition and the management approach to it (including a comprehensive discussion of the known risks, side effects and potential benefits of treatment), the patient (family) agrees to implement the following specific plan:  Start use of SPF daily (look for sunscreens that list ONLY zinc oxide and titanium dioxide under \"active ingredients\")  Transition to gentle skin care products (ideally fragrance free). Cerave, cetaphil, vanicream are good brands for this.  Start journal to track any triggers and attempt to avoid all triggers  Patient would like to continue with same regimen. Therefore, he will continue with topical azelaic acid and topical metronidazole daily. "   Will prescribe doxycycline 20 mg BID to be taken as needed for flares  Follow up in 1 year or as needed.    Scribe Attestation      I,:  Chrystal James am acting as a scribe while in the presence of the attending physician.:       I,:  Garrett Bull MD personally performed the services described in this documentation    as scribed in my presence.:

## 2024-04-06 DIAGNOSIS — L71.9 ROSACEA: ICD-10-CM

## 2024-04-09 RX ORDER — DOXYCYCLINE HYCLATE 20 MG
TABLET ORAL
Qty: 180 TABLET | Refills: 1 | Status: SHIPPED | OUTPATIENT
Start: 2024-04-09 | End: 2024-07-09

## 2024-04-18 ENCOUNTER — OFFICE VISIT (OUTPATIENT)
Age: 54
End: 2024-04-18
Payer: COMMERCIAL

## 2024-04-18 VITALS — WEIGHT: 198 LBS | HEIGHT: 74 IN | BODY MASS INDEX: 25.41 KG/M2 | TEMPERATURE: 98 F

## 2024-04-18 DIAGNOSIS — L71.9 ROSACEA: ICD-10-CM

## 2024-04-18 DIAGNOSIS — Z13.89 SCREENING FOR SKIN CONDITION: Primary | ICD-10-CM

## 2024-04-18 DIAGNOSIS — L81.4 LENTIGINES: ICD-10-CM

## 2024-04-18 DIAGNOSIS — D18.01 CHERRY ANGIOMA: ICD-10-CM

## 2024-04-18 DIAGNOSIS — D22.9 MULTIPLE NEVI: ICD-10-CM

## 2024-04-18 DIAGNOSIS — L82.1 SEBORRHEIC KERATOSIS: ICD-10-CM

## 2024-04-18 PROCEDURE — 99213 OFFICE O/P EST LOW 20 MIN: CPT

## 2024-04-18 RX ORDER — EMTRICITABINE AND TENOFOVIR DISOPROXIL FUMARATE 200; 300 MG/1; MG/1
TABLET, FILM COATED ORAL
COMMUNITY
Start: 2024-04-03

## 2024-04-18 NOTE — PROGRESS NOTES
"[facility] Note     Patient Name: Gonzalez Sunshine  Encounter Date: April 18, 2024     Have you been cared for by a [facility] Dermatologist in the last 3 years and, if so, which description applies to you?    Yes.  I have been here within the last 3 years, and my medical history has NOT changed since that time.  I am MALE/not capable of bearing children.    REVIEW OF SYSTEMS:  Have you recently had or currently have any of the following? No changes in my recent health.   PAST MEDICAL HISTORY:  Have you personally ever had or currently have any of the following?  If \"YES,\" then please provide more detail. No changes in my medical history.   HISTORY OF IMMUNOSUPPRESSION: Do you have a history of any of the following:  Systemic Immunosuppression such as Diabetes, Biologic or Immunotherapy, Chemotherapy, Organ Transplantation, Bone Marrow Transplantation?  No     Answering \"YES\" requires the addition of the dotphrase \"IMMUNOSUPPRESSED\" as the first diagnosis of the patient's visit.   FAMILY HISTORY:  Any \"first degree relatives\" (parent, brother, sister, or child) with the following?    No changes in my family's known health.   PATIENT EXPERIENCE:    Do you want the Dermatologist to perform a COMPLETE skin exam today including a clinical examination under the \"bra and underwear\" areas?  Yes  If necessary, do we have your permission to call and leave a detailed message on your Preferred Phone number that includes your specific medical information?  Yes      Allergies   Allergen Reactions    Bee Pollen Cough    Pollen Extract Cough      Current Outpatient Medications:     Azelaic Acid 15 % cream, Apply daily to face, Disp: 30 g, Rfl: 3    doxycycline (PERIOSTAT) 20 MG tablet, WHEN ROSACEA IS FLARING, TAKE ONE PILL TWICE DAILY WITH FOOD AND LARGE GLASS OF WATER. TAKE SECOND DOSE OF MEDICATION AT LEAST 2 HOURS PRIOR TO BED. AVOID SUN DURING USE OF THIS MEDICATION., Disp: 180 tablet, Rfl: 1    " emtricitabine-tenofovir (TRUVADA) 200-300 mg per tablet, take 1 tablet by mouth every day for 30 days, Disp: , Rfl:     metroNIDAZOLE (METROGEL) 1 % gel, Apply topically daily, Disp: 60 g, Rfl: 3    Multiple Vitamins-Minerals (Multivitamin Adult) CHEW, Chew 1 tablet, Disp: , Rfl:     Omega-3 Fatty Acids (Fish Oil) 300 MG CAPS, Take 2 capsules by mouth, Disp: , Rfl:     Current Facility-Administered Medications:     meclizine (ANTIVERT) tablet 25 mg, 25 mg, Oral, Q8H PRN, London Geronimo PA-C          Whom besides the patient is providing clinical information about today's encounter?   NO ADDITIONAL HISTORIAN (patient alone provided history)    Physical Exam and Assessment/Plan by Diagnosis:    Chief complaint: Patient is a 54 y/o male present for a baseline skin exam without personal history of skin cancer and dad has history of Melanoma. Pt has no spots of concern for today and has a history of Rosacea.    ROSACEA    Physical Exam:  Anatomic Location Affected:  face  Morphological Description:  minimal background erythema     Additional History of Present Condition: Currently using metronidazole 1% and Azelaic Acid 15% cream daily and Doxycycline 20mg twice a day as needed for flares. Patient states his last flare was in February. He only took the antibiotic for a few days. His rosacea is well under control.     Assessment and Plan:  Based on a thorough discussion of this condition and the management approach to it (including a comprehensive discussion of the known risks, side effects and potential benefits of treatment), the patient (family) agrees to implement the following specific plan:  Discussed alternative to metronidazole 1% cream and azelaic acid 15% cream such as compounded Rosacea Triple Cream- Azelaic Acid 15%/Ivermectin 1%/Metronidazole 1% from [facility]. Patient declined at this time. He will call the office if he would like to try this.   Continue Doxycycline 20 BID as needed for flares.  "  Practice sun protection. Apply broad spectrum (UVA and UVB) sunscreen, SPF 30 or higher every 2 hours. Wear sun protective clothing, hats, and sunglasses.  Follow-up as needed.     MELANOCYTIC NEVI (\"Moles\")    Physical Exam:  Anatomic Location Affected:   Mostly on sun-exposed areas of the trunk and extremities  Morphological Description:  Scattered, 1-4mm round to ovoid, symmetrical-appearing, even bordered, skin colored to dark brown macules/papules    Additional History of Present Condition:  Present on exam. Denies any pain, itch, bleeding. Present for years. Denies actively changing or growing moles.     Assessment and Plan:  Based on a thorough discussion of this condition and the management approach to it (including a comprehensive discussion of the known risks, side effects and potential benefits of treatment), the patient (family) agrees to implement the following specific plan:  Reassured benign.   Monitor for changes. ABCDE's of melanoma handout provided.   Practice sun protection. Apply broad spectrum (UVA and UVB) sunscreen, SPF 30 or higher every 2 hours. Wear sun protective clothing, hats, and sunglasses.     LENTIGO    Physical Exam:  Anatomic Location Affected:  sun exposed areas of trunk and extremities  Morphological Description:  multiple scattered tan to brown evenly pigmented macules    Additional History of Present Condition:  Present on exam.     Assessment and Plan:  Based on a thorough discussion of this condition and the management approach to it (including a comprehensive discussion of the known risks, side effects and potential benefits of treatment), the patient (family) agrees to implement the following specific plan:  Reassured benign.   Practice sun protection. Apply broad spectrum (UVA and UVB) sunscreen, SPF 30 or higher every 2 hours. Wear sun protective clothing, hats, and sunglasses.     SEBORRHEIC KERATOSIS; NON-INFLAMED    Physical Exam:  Anatomic Location Affected:  trunk " "  Morphological Description:  Flat and raised, waxy, smooth to warty textured, yellow to brownish-grey to dark brown to blackish, discrete, \"stuck-on\" appearing papules.    Additional History of Present Condition:  Present on exam. Patient denies any itching.     Assessment and Plan:  Based on a thorough discussion of this condition and the management approach to it (including a comprehensive discussion of the known risks, side effects and potential benefits of treatment), the patient (family) agrees to implement the following specific plan:  Reassured benign.     ANGIOMA (\"CHERRY ANGIOMA\")     Physical Exam:  Anatomic Location: scattered across trunk and extremities   Morphologic Description: 1-2 mm firm red to maroon to purples to blue discrete papule, on dermoscopy lacunae  by white septae seen     Additional History of Present Condition:  Present on exam.     Plan:  Reassured benign.     Follow-up: 1 year for skin exam.     Scribe Attestation      I,:  Georgina Contreras am acting as a scribe while in the presence of the attending physician.:       I,:  Ashley Luong PA-C personally performed the services described in this documentation    as scribed in my presence.:               "

## 2024-04-18 NOTE — PATIENT INSTRUCTIONS
"When outside we recommend using a wide brim hat, sunglasses, long sleeve and pants, sunscreen with SPF 30+ with reapplication every 2 hours, or SPF specific clothing     Melanocytic Nevi  Melanocytic nevi (\"moles\") are tan or brown, raised or flat areas of the skin which have an increased number of melanocytes. Melanocytes are the cells in our body which make pigment and account for skin color.    Some moles are present at birth (I.e., \"congenital nevi\"), while others come up later in life (i.e., \"acquired nevi\").  The sun can stimulate the body to make more moles.  Sunburns are not the only thing that triggers more moles.  Chronic sun exposure can do it too.     Clinically distinguishing a healthy mole from melanoma may be difficult, even for experienced dermatologists. The \"ABCDE's\" of moles have been suggested as a means of helping to alert a person to a suspicious mole and the possible increased risk of melanoma.  The suggestions for raising alert are as follows:    Asymmetry: Healthy moles tend to be symmetric, while melanomas are often asymmetric.  Asymmetry means if you draw a line through the mole, the two halves do not match in color, size, shape, or surface texture. Asymmetry can be a result of rapid enlargement of a mole, the development of a raised area on a previously flat lesion, scaling, ulceration, bleeding or scabbing within the mole.  Any mole that starts to demonstrate \"asymmetry\" should be examined promptly by a board certified dermatologist.     Border: Healthy moles tend to have discrete, even borders.  The border of a melanoma often blends into the normal skin and does not sharply delineate the mole from normal skin.  Any mole that starts to demonstrate \"uneven borders\" should be examined promptly by a board certified dermatologist.     Color: Healthy moles tend to be one color throughout.  Melanomas tend to be made up of different colors ranging from dark black, blue, white, or red.  Any mole " "that demonstrates a color change should be examined promptly by a board certified dermatologist.     Diameter: Healthy moles tend to be smaller than 0.6 cm in size; an exception are \"congenital nevi\" that can be larger.  Melanomas tend to grow and can often be greater than 0.6 cm (1/4 of an inch, or the size of a pencil eraser). This is only a guideline, and many normal moles may be larger than 0.6 cm without being unhealthy.  Any mole that starts to change in size (small to bigger or bigger to smaller) should be examined promptly by a board certified dermatologist.     Evolving: Healthy moles tend to \"stay the same.\"  Melanomas may often show signs of change or evolution such as a change in size, shape, color, or elevation.  Any mole that starts to itch, bleed, crust, burn, hurt, or ulcerate or demonstrate a change or evolution should be examined promptly by a board certified dermatologist.      Dysplastic Nevi  Dysplastic moles are moles that fit the ABCDE rules of melanoma but are not identified as melanomas when examined under the microscope.  They may indicate an increased risk of melanoma in that person. If there is a family history of melanoma, most experts agree that the person may be at an increased risk for developing a melanoma.  Experts still do not agree on what dysplastic moles mean in patients without a personal or family history of melanoma.  Dysplastic moles are usually larger than common moles and have different colors within it with irregular borders. The appearance can be very similar to a melanoma. Biopsies of dysplastic moles may show abnormalities which are different from a regular mole.      Melanoma  Malignant melanoma is a type of skin cancer that can be deadly if it spreads throughout the body. The incidence of melanoma in the [place] is growing faster than any other cancer. Melanoma usually grows near the surface of the skin for a period of time, and then begins to grow deeper into " "the skin. Once it grows deeper into the skin, the risk of spread to other organs greatly increases. Therefore, early detection and removal of a malignant melanoma may result in a better chance at a complete cure; removal after the tumor has spread may not be as effective, leading to worse clinical outcomes such as death.    The true rate of nevus transformation into a melanoma is unknown. It has been estimated that the lifetime risk for any acquired melanocytic nevus on any 20-year-old individual transforming into melanoma by age 80 is 0.03% (1 in 3,164) for men and 0.009% (1 in 10,800) for women.     The appearance of a \"new mole\" remains one of the most reliable methods for identifying a malignant melanoma.  Occasionally, melanomas appear as rapidly growing, blue-black, dome-shaped bumps within a previous mole or previous area of normal skin.  Other times, melanomas are suspected when a mole suddenly appears or changes. Itching, burning, or pain in a pigmented lesion should increase suspicion, but most patients with early melanoma have no skin discomfort whatsoever.  Melanoma can occur anywhere on the skin, including areas that are difficult for self-examination. Many melanomas are first noticed by other family members.  Suspicious-looking moles may be removed for microscopic examination.       You may be able to prevent death from melanoma by doing two simple things:    Try to avoid unnecessary sun exposure and protect your skin when it is exposed to the sun.  People who live near the equator, people who have intermittent exposures to large amounts of sun, and people who have had sunburns in childhood or adolescence have an increased risk for melanoma. Sun sense and vigilant sun protection may be keys to helping to prevent melanoma.  We recommend wearing UPF-rated sun protective clothing and sunglasses whenever possible and applying a moisturizer-sunscreen combination product (SPF 50+) such as Neutrogena Daily " "Defense to sun exposed areas of skin at least three times a day.    Have your moles regularly examined by a board certified dermatologist AND by yourself or a family member/friend at home.  We recommend that you have your moles examined at least once a year by a board certified dermatologist.  Use your birthday as an annual reminder to have your \"Birthday Suit\" (I.e., your skin) examined; it is a nice birthday gift to yourself to know that your skin is healthy appearing!  Additionally, at-home self examinations may be helpful for detecting a possible melanoma.  Use the ABCDEs we discussed and check your moles once a month at home.      Who gets lentigines?  Lentigines can affect males and females of all ages and races. Solar lentigines are especially prevalent in fair skinned adults. Lentigines associated with syndromes are present at birth or arise during childhood.    What causes lentigines?  Common forms of lentigo are due to exposure to ultraviolet radiation:  Sun damage including sunburn   Indoor tanning   Phototherapy, especially photochemotherapy (PUVA)    Ionizing radiation, eg radiation therapy, can also cause lentigines.  Several familial syndromes associated with widespread lentigines originate from mutations in Oscar-MAP kinase, mTOR signaling and PTEN pathways.    What are the clinical features of lentigines?  Lentigines have been classified into several different types depending on what they look like, where they appear on the body, causative factors, and whether they are associated to other diseases or conditions.  Lentigines may be solitary or more often, multiple. Most lentigines are smaller than 5 mm in diameter.    Lentigo simplex  A precursor to junctional naevus   Arises during childhood and early adult life   Found on trunk and limbs   Small brown round or oval macule or thin plaque   Jagged or smooth edge   May have a dry surface   May disappear in time  Solar lentigo  A precursor to seborrhoeic " keratosis   Found on chronically sun exposed sites such as hands, face, lower legs   May also follow sunburn to shoulders   Yellow, light or dark brown regular or irregular macule or thin plaque   May have a dry surface   Often has moth-eaten outline   Can slowly enlarge to several centimeters in diameter   May disappear, often through the process known as lichenoid keratosis   When atypical in appearance, may be difficult to distinguish from melanoma in situ  Ink spot lentigo  Also known as reticulated lentigo   Few in number compared to solar lentigines   Follows sunburn in very fair skinned individuals   Dark brown to black irregular ink spot-like macule  PUVA lentigo  Similar to ink spot lentigo but follows photochemotherapy (PUVA)   Location anywhere exposed to PUVA  Tanning bed lentigo  Similar to ink spot lentigo but follows indoor tanning   Location anywhere exposed to tanning bed  Radiation lentigo  Occurs in site of irradiation (accidental or therapeutic)   Associated with late-stage radiation dermatitis: epidermal atrophy, subcutaneous fibrosis, keratosis, telangiectasias  Melanotic macule  Mucosal surfaces or adjacent glabrous skin eg lip, vulva, penis, anus   Light to dark brown   Also called mucosal melanosis  Generalised lentigines  Found on any exposed or covered site from early childhood   Small macules may merge to form larger patches   Not associated with a syndrome   Also called lentigines profusa, multiple lentigines  Agminated lentigines  Naevoid eruption of lentigos confined to a single segmental area   Sharp demarcation in midline   May have associated neurological and developmental abnormalities  Patterned lentigines  Inherited tendency to lentigines on face, lips, buttocks, palms, soles   Recognised mainly in people of  ethnicity  Centrofacial neurodysraphic lentiginosis  Associated with mental retardation  Lentiginosis syndromes  Syndromes include LEOPARD/Hudson, Peutz-Jeghers,  Laugier-Hunziker, Moynahan, Xeroderma pigmentosum, myxoma syndromes (PEARL, NAME, Gallo), Ruvalcaba-Myhre-Asif, Bannayan-Zonnana syndrome, Cowden disease (multiple hamartoma syndrome )   Inheritance is autosomal dominant; sporadic cases common   Widespread lentigines present at birth or arise in early childhood   Associated with neural, endocrine, and mesenchymal tumors    How is the diagnosis made?  Lentigines are usually diagnosed clinically by their typical appearance. Concern regarding possibility of melanoma may lead to:  Dermatoscopy   Diagnostic excision biopsy    Histopathology of a lentigo shows:  Thickened epidermis   An increased number of melanocytes along the basal layer of epidermis   Unlike junctional melanocytic naevus, there are no nests of melanocytes   Increased melanin pigment within the keratinocytes   Additional features depending on type of lentigo    In contrast, an ephelis (freckle) shows sun-induced increased melanin within the keratinocytes, without an increase in number of cells.  What is the treatment for lentigines?  Most lentigines are left alone. Attempts to lighten them may not be successful. The following approaches are used:  SPF 50+ broad-spectrum sunscreen   Hydroquinone bleaching cream   Alpha hydroxy acids   Vitamin C   Retinoids   Azelaic acid   Chemical peels  Individual lesions can be permanently removed using:  Cryotherapy   Intense pulsed light   Pigment lasers    How can lentigines be prevented?  Lentigines associated with exposure ultraviolet radiation can be prevented by very careful sun protection. Clothing is more successful at preventing new lentigines than are sunscreens.    What is the outlook for lentigines?  Lentigines usually persist. They may increase in number with age and sun exposure. Some in sun-protected sites may fade and disappear.    Rosacea is a chronic rash affecting the mid-face including the nose, cheeks, chin, forehead, and eyelids. The incidence  is usually greatest between the ages of 30-60 years and is more common in people with fair skin. Common characteristics include redness, telangiectasias, papules and pustules over affected areas. Rosacea may look similar to acne, but there is a lack of comedones. Occasionally the eyes may also be involved in ocular rosacea. In advanced disease, enlargement of the sebaceous glands in the nose, termed rhinophyma, may be present.     Rosacea results in red spots (papules) and sometimes pustules over the face, but unlike acne there are no blackheads, whiteheads, or cystic nodules. Patients often experience increased facial flushing with prominent blood vessels (erythematotelangiectatic rosacea) and dry, sensitive skin. These symptoms are exacerbated by sun exposure, hot or spicy foods, topical steroids and oil-based facial products.     In ocular rosacea, eyelids may be red and sore due to conjunctivitis, keratitis, and episcleritis. If rhinophyma develops due to enlargement of sebaceous glands, the patient may have an enlarged and irregularly shaped nose with prominent pores. In rosacea that is refractory to treatment, patients can develop persistent redness and swelling of the face due to lymphatic obstruction (Morbihan disease).     Distribution around the cheeks may be confused with the malar or “butterfly rash” of lupus. However, the rash of lupus spares the nasal creases and lacks papules and pustules. If signs of photosensitivity, oral ulcers, arthritis, and kidney dysfunction are present then consider referral to a rheumatologist.     There are many potential causes of rosacea including genetic, environmental, vascular, and inflammatory factors. These include, but are not limited to:  Chronic exposure to ultraviolet radiation   Increased immune responses in the form of cathelicidins that promote vessel dilation and infiltration with white blood cells (neutrophils) into the dermis  Increased matrix  metalloproteinases such as collagen and elastase that remodel normal tissue may contribute to inflammation of the skin making it thicker and harder  There is some evidence to suggest that increased numbers of demodex mites on patient skin may contribute to rosacea papules     General Treatment Approach   Avoid exacerbating factors such as heat, spicy foods, and alcohol   Use daily SPF30+ sunscreen and other methods of coverage for sun protection  Use water-based make-up   Avoid applying topical steroids to affected areas as they can cause perioral dermatitis and exacerbate rosacea     Topical Treatment Approach  Metronidazole cream or gel by itself or in combination with oral antibiotics for more severe cases  Azelaic acid cream or lotion is effective for mild inflammatory rosacea when applied twice daily to affected areas  Brimonidine gel and oxymetazoline hydrochloride cream can reduce facial redness temporarily   Ivermectin cream can treat papulopustular rosacea by controlling demodex mites and inflammation   Pimecrolimus cream or tacrolimus ointment twice a day for 2-3 months can help reduce inflammation    Oral Treatment Approach  Antibiotics such as doxycycline, minocycline, or erythromycin for 1-3 months  Clonidine and carvedilol can help reduce facial flushing and are generally well tolerated. Common side effects include low blood pressure, gastrointestinal upset, dry eyes, blurred vision and low heart rate.   Isotretinoin at low doses can be effective for long term treatment when antibiotics fail. Side effects may make it unsuitable for some patients.   NSAIDs such as diclofenac can help reduce discomfort and redness in the skin.     Procedural/Surgical Treatment Approach   Vascular lasers or intense pulsed light treatment may be used to treat persistent telangiectasia and papulopustular rosacea  Plastic surgery and carbon dioxide lasers may be used to treat rhinophyma   Assessment and Plan:  Based on a  thorough discussion of this condition and the management approach to it (including a comprehensive discussion of the known risks, side effects and potential benefits of treatment), the patient (family) agrees to implement the following specific plan:  advised to change to Compound of Azelaic Acid 15%/Ivermectin 1%/Metronidazole 1% - apply daily to face - pt states he will decide and let us know

## 2024-07-04 DIAGNOSIS — L71.9 ROSACEA: ICD-10-CM

## 2024-07-05 RX ORDER — AZELAIC ACID 0.15 G/G
GEL TOPICAL
Qty: 50 G | Refills: 3 | Status: SHIPPED | OUTPATIENT
Start: 2024-07-05

## 2024-08-04 DIAGNOSIS — L71.9 ROSACEA: ICD-10-CM

## 2024-08-05 RX ORDER — METRONIDAZOLE 10 MG/G
GEL TOPICAL
Qty: 60 G | Refills: 3 | Status: SHIPPED | OUTPATIENT
Start: 2024-08-05

## 2024-09-12 ENCOUNTER — OFFICE VISIT (OUTPATIENT)
Age: 54
End: 2024-09-12
Payer: COMMERCIAL

## 2024-09-12 VITALS
TEMPERATURE: 98.8 F | BODY MASS INDEX: 26.17 KG/M2 | SYSTOLIC BLOOD PRESSURE: 120 MMHG | WEIGHT: 203.8 LBS | OXYGEN SATURATION: 97 % | HEART RATE: 88 BPM | DIASTOLIC BLOOD PRESSURE: 80 MMHG | RESPIRATION RATE: 18 BRPM

## 2024-09-12 DIAGNOSIS — U07.1 COVID-19: Primary | ICD-10-CM

## 2024-09-12 DIAGNOSIS — J02.9 SORE THROAT: ICD-10-CM

## 2024-09-12 PROBLEM — Z87.898 HISTORY OF SYNCOPE: Status: ACTIVE | Noted: 2024-09-12

## 2024-09-12 PROBLEM — Z98.890 HISTORY OF RADIOFREQUENCY ABLATION (RFA) PROCEDURE FOR CARDIAC ARRHYTHMIA: Status: ACTIVE | Noted: 2024-09-12

## 2024-09-12 PROBLEM — Z86.79 HISTORY OF ATRIAL FIBRILLATION: Status: ACTIVE | Noted: 2024-09-12

## 2024-09-12 LAB
S PYO AG THROAT QL: NEGATIVE
SARS-COV-2 AG UPPER RESP QL IA: POSITIVE
VALID CONTROL: ABNORMAL

## 2024-09-12 PROCEDURE — 87070 CULTURE OTHR SPECIMN AEROBIC: CPT | Performed by: PHYSICIAN ASSISTANT

## 2024-09-12 PROCEDURE — G0382 LEV 3 HOSP TYPE B ED VISIT: HCPCS | Performed by: PHYSICIAN ASSISTANT

## 2024-09-12 PROCEDURE — 87811 SARS-COV-2 COVID19 W/OPTIC: CPT | Performed by: PHYSICIAN ASSISTANT

## 2024-09-12 PROCEDURE — S9083 URGENT CARE CENTER GLOBAL: HCPCS | Performed by: PHYSICIAN ASSISTANT

## 2024-09-12 PROCEDURE — 87880 STREP A ASSAY W/OPTIC: CPT | Performed by: PHYSICIAN ASSISTANT

## 2024-09-12 NOTE — PROGRESS NOTES
[facility]        NAME: Gonzalez Sunshine is a 53 y.o. male  : 1970    MRN: [id number]  DATE: 2024  TIME: 3:10 PM    Assessment and Plan   Sore throat [J02.9]  1. Sore throat  POCT rapid ANTIGEN strepA    Poct Covid 19 Rapid Antigen Test          Continue with symptomatic treatments    The patient and/or parent/legal guardian verbalized understanding of exam findings and   Treatment plan. We engaged in the shared decision-making process and treatment options were   discussed at length with the patient.  All questions, concerns and complaints were answered and   addressed to the patient's' and/or parent/legal guardians's satisfaction.    Patient Instructions   There are no Patient Instructions on file for this visit.    Follow up with PCP in 3-5 days.  Proceed to  ER if symptoms worsen.    If tests are performed, our office will contact you with results only if   changes need to made to the care plan discussed with you at the visit.   You can review your full results on [facility].     Chief Complaint     Chief Complaint   Patient presents with    Cold Like Symptoms     Cough, body aches, congestion, fatigue, sore throat since returning from [place] 9/10         History of Present Illness       HPI  Patient comes in complaining of cough body aches congestion fatigue sore throat this has been since he returned from [place] 2 days ago.  Has not yet tested for COVID at home.  Denies any chest pain or shortness of breath.    Review of Systems   Review of Systems  All other related systems reviewed and are negative except as noted in HPI    Current Medications       Current Outpatient Medications:     Azelaic Acid 15 % cream, APPLY DAILY TO FACE AS DIRECTED, Disp: 50 g, Rfl: 3    emtricitabine-tenofovir (TRUVADA) 200-300 mg per tablet, take 1 tablet by mouth every day for 30 days, Disp: , Rfl:     metroNIDAZOLE (METROGEL) 1 % gel, APPLY TO AFFECTED AREA TOPICALLY EVERY DAY, Disp:  60 g, Rfl: 3    Multiple Vitamins-Minerals (Multivitamin Adult) CHEW, Chew 1 tablet, Disp: , Rfl:     Omega-3 Fatty Acids (Fish Oil) 300 MG CAPS, Take 2 capsules by mouth, Disp: , Rfl:     Current Facility-Administered Medications:     meclizine (ANTIVERT) tablet 25 mg, 25 mg, Oral, Q8H PRN, London Geronimo PA-C    Current Allergies     Allergies as of 09/12/2024 - Reviewed 09/12/2024   Allergen Reaction Noted    Bee pollen Cough 01/01/2010    Pollen extract Cough 01/01/2010            The following portions of the patient's history were reviewed and updated as appropriate: allergies, current medications, past family history, past medical history, past social history, past surgical history and problem list.     Past Medical History:   Diagnosis Date    A-fib (HCC)        No past surgical history on file.    No family history on file.      Medications have been verified.        Objective   /80 (BP Location: Left arm, Patient Position: Sitting, Cuff Size: Adult)   Pulse 88   Temp 98.8 °F (37.1 °C) (Tympanic)   Resp 18   Wt 92.4 kg (203 lb 12.8 oz)   SpO2 97%   BMI 26.17 kg/m²   No LMP for male patient.       Physical Exam     Physical Exam  Constitutional:       General: He is not in acute distress.     Appearance: He is well-developed.   HENT:      Head: Normocephalic and atraumatic.   Eyes:      General: No scleral icterus.     Conjunctiva/sclera: Conjunctivae normal.   Cardiovascular:      Rate and Rhythm: Normal rate and regular rhythm.      Heart sounds: Normal heart sounds. No murmur heard.  Pulmonary:      Effort: Pulmonary effort is normal. No respiratory distress.      Breath sounds: No stridor. No wheezing, rhonchi or rales.   Musculoskeletal:      Cervical back: Normal range of motion and neck supple.   Skin:     General: Skin is warm and dry.   Neurological:      Mental Status: He is alert and oriented to person, place, and time.   Psychiatric:         Behavior: Behavior normal.         Ortho  "Exam        Procedures  No Procedures performed today        Note: Portions of this record may have been created with voice recognition software. Occasional wrong word or \"sound a like\" substitutions may have occurred due to the inherent limitations of voice recognition software. Please read the chart carefully and recognize, using context, where substitutions have occurred.*      "

## 2024-09-14 LAB — BACTERIA THROAT CULT: NORMAL

## 2024-10-01 NOTE — DISCHARGE INSTRUCTIONS
Wear brace for immobilization  Take Tylenol and ibuprofen for pain  Please call tomorrow to schedule a follow-up with orthopedics 
(V5) oriented

## 2024-10-03 DIAGNOSIS — L71.9 ROSACEA: ICD-10-CM

## 2024-10-03 RX ORDER — DOXYCYCLINE HYCLATE 20 MG
TABLET ORAL
Qty: 180 TABLET | Refills: 1 | Status: SHIPPED | OUTPATIENT
Start: 2024-10-03 | End: 2025-04-03

## 2024-12-16 ENCOUNTER — OFFICE VISIT (OUTPATIENT)
Age: 54
End: 2024-12-16
Payer: COMMERCIAL

## 2024-12-16 VITALS
BODY MASS INDEX: 25.8 KG/M2 | TEMPERATURE: 97.6 F | OXYGEN SATURATION: 98 % | SYSTOLIC BLOOD PRESSURE: 124 MMHG | WEIGHT: 201 LBS | HEIGHT: 74 IN | DIASTOLIC BLOOD PRESSURE: 80 MMHG | HEART RATE: 76 BPM

## 2024-12-16 DIAGNOSIS — G47.33 OSA (OBSTRUCTIVE SLEEP APNEA): Primary | ICD-10-CM

## 2024-12-16 DIAGNOSIS — Z86.79 HISTORY OF ATRIAL FIBRILLATION: ICD-10-CM

## 2024-12-16 PROCEDURE — 99203 OFFICE O/P NEW LOW 30 MIN: CPT | Performed by: INTERNAL MEDICINE

## 2024-12-16 NOTE — ASSESSMENT & PLAN NOTE
Patient in general is doing fine, I believe he needs his mouth appliance adjustment or refitting or change.  He is open to the idea of having a CPAP machine but he prefers to continue with his mouth device first.  I will refer patient to Dr. Sellers sleep specialist.

## 2024-12-16 NOTE — ASSESSMENT & PLAN NOTE
Status post successful ablation and currently off any medications.  He has Lopressor at home to use as needed but has not needed in long time.

## 2024-12-16 NOTE — PROGRESS NOTES
Consultation - Pulmonary Medicine   Gonzalez Sunshine 54 y.o. male MRN: [id number]    Physician Requesting Consult: Self-referral  Reason for Consult: Sleep apnea    SHARIFA (obstructive sleep apnea)  Patient in general is doing fine, I believe he needs his mouth appliance adjustment or refitting or change.  He is open to the idea of having a CPAP machine but he prefers to continue with his mouth device first.  I will refer patient to Dr. Sellers sleep specialist.    History of atrial fibrillation  Status post successful ablation and currently off any medications.  He has Lopressor at home to use as needed but has not needed in long time.      No follow-ups on file.    Diagnoses and all orders for this visit:    SHARIFA (obstructive sleep apnea)    History of atrial fibrillation    I sent a message to Dr. Sellers to see the patient in the near future  ______________________________________________________________________    HPI:    Gonzalez Sunshine is a 54 y.o. male who presents for evaluation of sleep apnea and therapy..  Patient was diagnosed with obstructive sleep apnea in [place], he was treated with mouth appliance/guard, he used it for the past few years successfully without any issues but recently over the past 12 months he started to have some jaw pain in the morning.  He tried to adjust his appliance but it did not resolve the issue.  He used to have severe snoring prior to treatment of his sleep apnea but currently he feels fine with some moaning noises but no significant snoring, denies sleep awakening, denies morning headache or fatigue or excessive daytime sleepiness.  In general he is doing fine.  No recent dental procedure or work.  Patient had history of atrial fibrillation that was treated with beta-blockers and he underwent ablation successfully and currently he is doing fine without any complaints and he is not on any anticoagulation.    Patient has no pets at home, never  "smoked cigarettes, denies vaping.  He is a  with no occupational exposure.      Review of Systems:  Review of Systems  Aside from what is mentioned in the HPI, the review of systems otherwise negative.    Current Medications:    Current Outpatient Medications:     Azelaic Acid 15 % cream, APPLY DAILY TO FACE AS DIRECTED, Disp: 50 g, Rfl: 3    doxycycline (PERIOSTAT) 20 MG tablet, PLEASE SEE ATTACHED FOR DETAILED DIRECTIONS, Disp: 180 tablet, Rfl: 1    emtricitabine-tenofovir (TRUVADA) 200-300 mg per tablet, take 1 tablet by mouth every day for 30 days, Disp: , Rfl:     metroNIDAZOLE (METROGEL) 1 % gel, APPLY TO AFFECTED AREA TOPICALLY EVERY DAY, Disp: 60 g, Rfl: 3    Multiple Vitamins-Minerals (Multivitamin Adult) CHEW, Chew 1 tablet, Disp: , Rfl:     Omega-3 Fatty Acids (Fish Oil) 300 MG CAPS, Take 2 capsules by mouth, Disp: , Rfl:     Current Facility-Administered Medications:     meclizine (ANTIVERT) tablet 25 mg, 25 mg, Oral, Q8H PRN, [place] Geronimo PA-C    Historical Information   Past Medical History:   Diagnosis Date    A-fib (HCC)     Sleep apnea, obstructive      History reviewed. No pertinent surgical history.  Social History   Social History     Tobacco Use   Smoking Status Never   Smokeless Tobacco Never       Occupational history:  No occupational exposure    Family History:   History reviewed. No pertinent family history.      PhysicalExamination:  Vitals:   /80   Pulse 76   Temp 97.6 °F (36.4 °C)   Ht 6' 2\" (1.88 m)   Wt 91.2 kg (201 lb)   SpO2 98%   BMI 25.81 kg/m²     Gen:  Comfortable on room air.  No conversational dyspnea  HEENT:  Conjugate gaze.  sclerae anicteric.  Oropharynx moist, Mallampati 2  Neck: Trachea is midline. No JVD. No adenopathy  Chest: Equal breath sounds and clear to auscultation bilaterally, no wheezing or crackles  Cardiac: S1-S2 regular. no murmur  Abdomen:  benign  Extremities: No edema  Neuro:  Normal speech and mentation      Diagnostic " "Data:  Labs:  I personally reviewed the most recent laboratory data pertinent to today's visit    No results found for: \"WBC\", \"HGB\", \"HCT\", \"MCV\", \"PLT\"  No results found for: \"GLUCOSE\", \"CALCIUM\", \"NA\", \"K\", \"CO2\", \"CL\", \"BUN\", \"CREATININE\"  No results found for: \"IGE\"  No results found for: \"ALT\", \"AST\", \"GGT\", \"ALKPHOS\", \"BILITOT\"    Imaging:  I personally reviewed the images on the PAC system pertinent to today's visit  Chest x-ray report from 2021 at outside facility: Clear lungs    Other studies:  Echocardiogram: LVEF 60%, normal RV      Sleep study [facility] 2021: Moderate obstructive sleep apnea        Jack Shelby MD  Answers submitted by the patient for this visit:  Pulmonology Questionnaire (Submitted on 12/13/2024)  Chief Complaint: Primary symptoms  Chronicity: recurrent  When did you first notice your symptoms?: more than 1 year ago  How often do your symptoms occur?: intermittently  Since you first noticed this problem, how has it changed?: unchanged  Do you have shortness of breath that occurs with effort or exertion?: No  Do you have ear congestion?: No  Do you have heartburn?: No  Do you have fatigue?: No  Do you have nasal congestion?: No  Do you have shortness of breath when lying flat?: No  Do you have shortness of breath when you wake up?: No  Do you have sweats?: No  Have you experienced weight loss?: No  Which of the following makes your symptoms worse?: nothing  Which of the following makes your symptoms better?: nothing    "

## 2025-03-26 DIAGNOSIS — L71.9 ROSACEA: ICD-10-CM

## 2025-03-26 RX ORDER — DOXYCYCLINE HYCLATE 20 MG
TABLET ORAL
Qty: 180 TABLET | Refills: 0 | Status: SHIPPED | OUTPATIENT
Start: 2025-03-26 | End: 2025-06-26

## 2025-06-20 DIAGNOSIS — L71.9 ROSACEA: ICD-10-CM

## 2025-06-20 RX ORDER — DOXYCYCLINE HYCLATE 20 MG
TABLET ORAL
Qty: 60 TABLET | Refills: 0 | Status: SHIPPED | OUTPATIENT
Start: 2025-06-20 | End: 2025-07-20

## 2025-06-20 RX ORDER — ROSUVASTATIN CALCIUM 10 MG/1
1 TABLET, COATED ORAL DAILY
COMMUNITY
Start: 2025-05-27

## 2025-07-13 DIAGNOSIS — L71.9 ROSACEA: ICD-10-CM

## 2025-07-14 RX ORDER — DOXYCYCLINE HYCLATE 20 MG
TABLET ORAL
Qty: 180 TABLET | Refills: 0 | Status: SHIPPED | OUTPATIENT
Start: 2025-07-14 | End: 2025-10-14

## 2025-07-23 ENCOUNTER — OFFICE VISIT (OUTPATIENT)
Age: 55
End: 2025-07-23
Payer: COMMERCIAL

## 2025-07-23 VITALS
WEIGHT: 209.2 LBS | HEIGHT: 74 IN | BODY MASS INDEX: 26.85 KG/M2 | TEMPERATURE: 98.2 F | SYSTOLIC BLOOD PRESSURE: 114 MMHG | HEART RATE: 70 BPM | OXYGEN SATURATION: 97 % | DIASTOLIC BLOOD PRESSURE: 70 MMHG | RESPIRATION RATE: 14 BRPM

## 2025-07-23 DIAGNOSIS — Z13.89 SCREENING FOR SKIN CONDITION: ICD-10-CM

## 2025-07-23 DIAGNOSIS — L82.1 SEBORRHEIC KERATOSIS: ICD-10-CM

## 2025-07-23 DIAGNOSIS — L71.9 ROSACEA: Primary | ICD-10-CM

## 2025-07-23 DIAGNOSIS — D22.9 MULTIPLE NEVI: ICD-10-CM

## 2025-07-23 DIAGNOSIS — L81.4 LENTIGINES: ICD-10-CM

## 2025-07-23 DIAGNOSIS — D18.01 CHERRY ANGIOMA: ICD-10-CM

## 2025-07-23 PROCEDURE — 99214 OFFICE O/P EST MOD 30 MIN: CPT | Performed by: NURSE PRACTITIONER

## 2025-07-23 RX ORDER — METRONIDAZOLE 10 MG/G
GEL TOPICAL
Qty: 60 G | Refills: 3 | Status: SHIPPED | OUTPATIENT
Start: 2025-07-23

## 2025-07-23 RX ORDER — AZELAIC ACID 0.15 G/G
GEL TOPICAL
Qty: 50 G | Refills: 3 | Status: SHIPPED | OUTPATIENT
Start: 2025-07-23

## 2025-07-23 NOTE — PROGRESS NOTES
"[facility] Note     Patient Name: Gonzalez Sunshine  Encounter Date: 7/23/2025       Have you been cared for by a [facility] Dermatologist in the last 3 years and, if so, which description applies to you? Yes. I have been here within the last 3 years, and my medical history has NOT changed since that time. I am not of child-bearing potential.     REVIEW OF SYSTEMS:  Have you recently had or currently have any of the following? No changes in my recent health.   PAST MEDICAL HISTORY:  Have you personally ever had or currently have any of the following?  If \"YES,\" then please provide more detail. No changes in my medical history.   HISTORY OF IMMUNOSUPPRESSION: Do you have a history of any of the following:  Systemic Immunosuppression such as Diabetes, Biologic or Immunotherapy, Chemotherapy, Organ Transplantation, Bone Marrow Transplantation or Prednisone?  No     Answering \"YES\" requires the addition of the dotphrase \"IMMUNOSUPPRESSED\" as the first diagnosis of the patient's visit.   FAMILY HISTORY:  Any \"first degree relatives\" (parent, brother, sister, or child) with the following?    No changes in my family's known health.   PATIENT EXPERIENCE:    Do you want the Dermatologist to perform a COMPLETE skin exam today including a clinical examination under the \"bra and underwear\" areas?  Yes  If necessary, do we have your permission to call and leave a detailed message on your Preferred Phone number that includes your specific medical information?  Yes      Allergies[1] Current Medications[2]          Whom besides the patient is providing clinical information about today's encounter?   NO ADDITIONAL HISTORIAN (patient alone provided history)    Physical Exam and Assessment/Plan by Diagnosis: Patient last evaluated by Ashley on 4/18/24.  Denies any personal skin cancer history.  States father has history of melanoma.      SEBORRHEIC KERATOSES  - Relevant exam: Scattered over the trunk/extremities " are waxy brown to black plaques and papules with stuck on appearance  - Exam and clinical history consistent with seborrheic keratoses  - Counseled that these are benign growths that do not require treatment  - Counseled that removal of lesions is considered cosmetic and so would incur a fee should patient elect to move forward.   - Patient to hold on treatments for now but will inform us should they desire additional treatments    MELANOCYTIC NEVI  -Relevant exam: Scattered over the trunk/extremities are homogenously pigmented brown macules and papules. ELM performed and without concerning findings.  - Exam and clinical history consistent with melanocytic nevi  - Educated on the ABCDE's of melanoma; handout provided  - Counseled to return to clinic prior to scheduled appointment should any of these lesions change or should any new lesions of concern arise  - Counseled on use of sun protection daily. Reviewed latest FDA sunscreen guidelines, including use of broad spectrum (UVA and UVB blocking) sunscreen or sun protective clothing with SPF 30-50 every 2-3 hours and reapplied after exposure to water; use of photoprotective clothing, including a broad brim hat and UPF rated clothing if outdoors for several hours; avoid use of tanning beds as these pose significant risk for melanoma and skin cancer.  Follow up in 1 year for skin check    LENTIGINES  OTHER SKIN CHANGES DUE TO CHRONIC EXPOSURE TO NONIONIZING RADIATION  - Relevant exam: Over sun exposed areas are brown macules. ELM performed and without concerning findings.  - Exam and clinical history consistent with lentigines.  - Educated that these are indicative of prior sun exposure.   - Counseled to return to clinic prior to scheduled appointment should any of these lesions change or should any new lesions of concern arise.  - Recommended use of sunscreen as above and below.  - Counseled on use of sun protection daily. Reviewed latest FDA sunscreen guidelines,  including use of broad spectrum (UVA and UVB blocking) sunscreen or sun protective clothing with SPF 30-50 every 2-3 hours and reapplied after exposure to water; use of photoprotective clothing, including a broad brim hat and UPF rated clothing if outdoors for several hours; avoid use of tanning beds as these pose significant risk for melanoma and skin cancer.    CHERRY ANGIOMAS  - Relevant exam: Scattered over the trunk/extremities are red papules  - Exam and clinical history consistent with cherry angiomas  - Educated that these are benign  - Educated that removal is considered aesthetic and would incur a fee.  - Patient does not wish to pursue removal at this time but will contact us should this change.    ROSACEA    Physical Exam:  Anatomic Location Affected:  Face  Morphological Description:  minimal background erythema   Pertinent Positives:  Pertinent Negatives:    Additional History of Present Condition:  Patient continues to use Metronidazole 1% gel and azelaic acid 15% cream daily, as well as doxycycline 20 mg BID as needed for flares.  He denies any recent flares.      Assessment and Plan:  Based on a thorough discussion of this condition and the management approach to it (including a comprehensive discussion of the known risks, side effects and potential benefits of treatment), the patient (family) agrees to implement the following specific plan:  Continue Metronidazole 1% gel- Apply to affected area topically everyday   Continue Azelaic Acid 15% cream- Apply daily to face as directed.  Continue Doxycycline 20 mg-  When Rosacea is flaring, take 1 tab 2 times a day with food and a large glass of water, avoid sun.     Scribe Attestation      I,:  Lotus Pickering MA am acting as a scribe while in the presence of the attending physician.:       I,:  KAEL Hinojosa personally performed the services described in this documentation    as scribed in my presence.:                  [1]    Allergies  Allergen Reactions    Bee Pollen Cough    Pollen Extract Cough   [2]   Current Outpatient Medications:     Azelaic Acid 15 % cream, APPLY DAILY TO FACE AS DIRECTED, Disp: 50 g, Rfl: 3    doxycycline (PERIOSTAT) 20 MG tablet, WHEN ROSACEA IS FLARING, TAKE 1 TAB TWICE DAILY WITH FOOD AND LARGE GLASS OF WATER. AVOID SUN, Disp: 180 tablet, Rfl: 0    emtricitabine-tenofovir (TRUVADA) 200-300 mg per tablet, take 1 tablet by mouth every day for 30 days, Disp: , Rfl:     metroNIDAZOLE (METROGEL) 1 % gel, APPLY TO AFFECTED AREA TOPICALLY EVERY DAY, Disp: 60 g, Rfl: 3    Multiple Vitamins-Minerals (Multivitamin Adult) CHEW, Chew 1 tablet, Disp: , Rfl:     Omega-3 Fatty Acids (Fish Oil) 300 MG CAPS, Take 2 capsules by mouth, Disp: , Rfl:     rosuvastatin (CRESTOR) 10 MG tablet, Take 1 tablet by mouth in the morning, Disp: , Rfl:     Current Facility-Administered Medications:     meclizine (ANTIVERT) tablet 25 mg, 25 mg, Oral, Q8H PRN, [place] Geronimo PA-C